# Patient Record
Sex: MALE | Race: WHITE | NOT HISPANIC OR LATINO | URBAN - METROPOLITAN AREA
[De-identification: names, ages, dates, MRNs, and addresses within clinical notes are randomized per-mention and may not be internally consistent; named-entity substitution may affect disease eponyms.]

---

## 2023-01-27 ENCOUNTER — TELEPHONE ENCOUNTER (OUTPATIENT)
Dept: URBAN - METROPOLITAN AREA CLINIC 7 | Facility: CLINIC | Age: 88
End: 2023-01-27

## 2023-01-30 ENCOUNTER — WEB ENCOUNTER (OUTPATIENT)
Dept: URBAN - METROPOLITAN AREA CLINIC 63 | Facility: CLINIC | Age: 88
End: 2023-01-30

## 2023-01-30 ENCOUNTER — DASHBOARD ENCOUNTERS (OUTPATIENT)
Age: 88
End: 2023-01-30

## 2023-01-30 ENCOUNTER — OFFICE VISIT (OUTPATIENT)
Dept: URBAN - METROPOLITAN AREA CLINIC 63 | Facility: CLINIC | Age: 88
End: 2023-01-30
Payer: MEDICARE

## 2023-01-30 VITALS
TEMPERATURE: 97.9 F | WEIGHT: 224 LBS | OXYGEN SATURATION: 95 % | HEIGHT: 72 IN | DIASTOLIC BLOOD PRESSURE: 90 MMHG | BODY MASS INDEX: 30.34 KG/M2 | HEART RATE: 88 BPM | SYSTOLIC BLOOD PRESSURE: 160 MMHG

## 2023-01-30 DIAGNOSIS — R19.5 HEME + STOOL: ICD-10-CM

## 2023-01-30 DIAGNOSIS — Z79.01 LONG TERM CURRENT USE OF ANTICOAGULANT: ICD-10-CM

## 2023-01-30 DIAGNOSIS — I48.11 LONGSTANDING PERSISTENT ATRIAL FIBRILLATION: ICD-10-CM

## 2023-01-30 DIAGNOSIS — D50.0 IRON DEFICIENCY ANEMIA DUE TO CHRONIC BLOOD LOSS: ICD-10-CM

## 2023-01-30 PROBLEM — 711150003: Status: ACTIVE | Noted: 2023-01-30

## 2023-01-30 PROBLEM — 706923002: Status: ACTIVE | Noted: 2023-01-30

## 2023-01-30 PROBLEM — 59614000: Status: ACTIVE | Noted: 2023-01-30

## 2023-01-30 PROBLEM — 724556004: Status: ACTIVE | Noted: 2023-01-30

## 2023-01-30 PROCEDURE — 99203 OFFICE O/P NEW LOW 30 MIN: CPT | Performed by: NURSE PRACTITIONER

## 2023-01-30 RX ORDER — APIXABAN 5 MG/1
TAKE 1 TABLET BY MOUTH TWICE DAILY TABLET, FILM COATED ORAL
Qty: 60 EACH | Refills: 2 | Status: ACTIVE | COMMUNITY

## 2023-01-30 RX ORDER — BISOPROLOL FUMARATE 10 MG/1
TABLET ORAL
Qty: 90 TABLET | Status: ACTIVE | COMMUNITY

## 2023-01-30 NOTE — HPI-HPI
He has noticed changes in bowel habits over the past 2 months. Over this time he started seeing darker color stools and has samm appearance instead of normal brown color. He states sometimes seeing maroon color. He completed labs in November with Hemoglobin 12.5.  He repeated labs a month later in December 2022 and Hemoglobin 11.3.  He is on Eliquis due to Atrial Fibrilation.  He reports no history of gerd. He denies any dyspepsia, nausea, abdominal pain. He states Bowel habits changed from once daily firms stools to now 3 times daily with looser stools and gas.  He has never had a EGD or coloonoscopy. No known family history of colon cancer. He has a family history of Pancreatic Cancer- Sister and Mother had Cervicle cancer.

## 2023-01-30 NOTE — PHYSICAL EXAM CHEST:
no deformities,  no significant scars are present,  no masses present, breathing is unlabored without accessory muscle use, normal breath sounds

## 2023-01-30 NOTE — PHYSICAL EXAM RECTAL:
Digital rectal examination reveals firm enlarged prostate, Dark maroon colored stools strongly heme positive. EMILIA rectal masses or lesions.

## 2023-01-31 ENCOUNTER — CLAIMS CREATED FROM THE CLAIM WINDOW (OUTPATIENT)
Dept: URBAN - METROPOLITAN AREA MEDICAL CENTER 14 | Facility: MEDICAL CENTER | Age: 88
End: 2023-01-31
Payer: MEDICARE

## 2023-01-31 DIAGNOSIS — D50.9 ANEMIA: ICD-10-CM

## 2023-01-31 DIAGNOSIS — R10.9 ABDOMINAL PAIN: ICD-10-CM

## 2023-01-31 DIAGNOSIS — K27.9 PEPTIC ULCER: ICD-10-CM

## 2023-01-31 DIAGNOSIS — K92.2 ACUTE GI BLEEDING: ICD-10-CM

## 2023-01-31 PROCEDURE — 99223 1ST HOSP IP/OBS HIGH 75: CPT | Performed by: INTERNAL MEDICINE

## 2023-02-01 ENCOUNTER — CLAIMS CREATED FROM THE CLAIM WINDOW (OUTPATIENT)
Dept: URBAN - METROPOLITAN AREA MEDICAL CENTER 14 | Facility: MEDICAL CENTER | Age: 88
End: 2023-02-01
Payer: MEDICARE

## 2023-02-01 DIAGNOSIS — K92.2 ACUTE GI BLEEDING: ICD-10-CM

## 2023-02-01 PROCEDURE — 99223 1ST HOSP IP/OBS HIGH 75: CPT | Performed by: NURSE PRACTITIONER

## 2023-02-01 PROCEDURE — 99232 SBSQ HOSP IP/OBS MODERATE 35: CPT | Performed by: NURSE PRACTITIONER

## 2023-02-02 ENCOUNTER — CLAIMS CREATED FROM THE CLAIM WINDOW (OUTPATIENT)
Dept: URBAN - METROPOLITAN AREA MEDICAL CENTER 14 | Facility: MEDICAL CENTER | Age: 88
End: 2023-02-02
Payer: MEDICARE

## 2023-02-02 DIAGNOSIS — K44.9 HIATAL HERNIA: ICD-10-CM

## 2023-02-02 DIAGNOSIS — K64.0 FIRST DEGREE HEMORRHOIDS: ICD-10-CM

## 2023-02-02 DIAGNOSIS — K92.2 ACUTE GI BLEEDING: ICD-10-CM

## 2023-02-02 DIAGNOSIS — R19.5 HEME + STOOL: ICD-10-CM

## 2023-02-02 DIAGNOSIS — D12.6 COLON POLYPS: ICD-10-CM

## 2023-02-02 DIAGNOSIS — D50.0 IRON DEFICIENCY ANEMIA DUE TO CHRONIC BLOOD LOSS: ICD-10-CM

## 2023-02-02 PROCEDURE — 45380 COLONOSCOPY AND BIOPSY: CPT | Performed by: INTERNAL MEDICINE

## 2023-02-02 PROCEDURE — 43239 EGD BIOPSY SINGLE/MULTIPLE: CPT | Performed by: INTERNAL MEDICINE

## 2023-02-03 ENCOUNTER — CLAIMS CREATED FROM THE CLAIM WINDOW (OUTPATIENT)
Dept: URBAN - METROPOLITAN AREA MEDICAL CENTER 14 | Facility: MEDICAL CENTER | Age: 88
End: 2023-02-03
Payer: MEDICARE

## 2023-02-03 DIAGNOSIS — K92.2 ACUTE GI BLEEDING: ICD-10-CM

## 2023-02-03 PROCEDURE — 99232 SBSQ HOSP IP/OBS MODERATE 35: CPT | Performed by: NURSE PRACTITIONER
